# Patient Record
Sex: FEMALE | Race: WHITE | NOT HISPANIC OR LATINO
[De-identification: names, ages, dates, MRNs, and addresses within clinical notes are randomized per-mention and may not be internally consistent; named-entity substitution may affect disease eponyms.]

---

## 2019-08-22 PROBLEM — Z00.00 ENCOUNTER FOR PREVENTIVE HEALTH EXAMINATION: Status: ACTIVE | Noted: 2019-08-22

## 2019-08-23 ENCOUNTER — APPOINTMENT (OUTPATIENT)
Dept: BREAST CENTER | Facility: CLINIC | Age: 24
End: 2019-08-23
Payer: COMMERCIAL

## 2019-08-23 VITALS
DIASTOLIC BLOOD PRESSURE: 75 MMHG | HEART RATE: 104 BPM | BODY MASS INDEX: 19.26 KG/M2 | HEIGHT: 69 IN | SYSTOLIC BLOOD PRESSURE: 118 MMHG | WEIGHT: 130 LBS

## 2019-08-23 DIAGNOSIS — Z72.3 LACK OF PHYSICAL EXERCISE: ICD-10-CM

## 2019-08-23 DIAGNOSIS — Z78.9 OTHER SPECIFIED HEALTH STATUS: ICD-10-CM

## 2019-08-23 PROCEDURE — 99203 OFFICE O/P NEW LOW 30 MIN: CPT

## 2019-08-25 ENCOUNTER — TRANSCRIPTION ENCOUNTER (OUTPATIENT)
Age: 24
End: 2019-08-25

## 2019-09-16 ENCOUNTER — MESSAGE (OUTPATIENT)
Age: 24
End: 2019-09-16

## 2019-09-16 NOTE — HISTORY OF PRESENT ILLNESS
[FreeTextEntry1] : 24 year old Rastafarian female referred by Dr. Walters presents for consultation regarding left palpable breast mass 12:00 first noted 3 years ago. Pt states it has not grown.  Denies other masses. \par \par 8/21/19 ([facility])--b/l breast US showing multiple smoothly outlined hypoechoic solid nodules in the breasts, bilaterally consistent with benign fibroadenomas. Dominant palpable heterogeneous hypoechoic solid nodule left breast 12:00 4cmFN with increased vascularity 3 x 2.1 x 3.1cm. Us guided core biopsy recc to r/o phyllodes tumor BR 4. Remaining solid nodules b/l can be followed in short interval f/u. \par 8/23/19 ([facility])--patient has US core biopsy of left 12:00 mass scheduled\par \par Denies family h/o breast or ovarian cancer. No h/o other cancers. \par \par

## 2019-09-16 NOTE — PAST MEDICAL HISTORY
[Menstruating] : The patient is menstruating [Menarche Age ____] : age at menarche was [unfilled] [Definite ___ (Date)] : the last menstrual period was [unfilled] [Irregular Cycle Intervals] : are  irregular [Total Preg ___] : G[unfilled] [History of Hormone Replacement Treatment] : has no history of hormone replacement treatment [FreeTextEntry5] : None [FreeTextEntry7] : OCP's x 3 years, stopped in May 2019 [FreeTextEntry6] : None [FreeTextEntry8] : No history

## 2019-09-16 NOTE — ADDENDUM
[FreeTextEntry1] : Will await concordance and if rec is for excisional biopsy of the 12:00, will schedule and 2:00 was FA- if concorrdant will offer 6 mos f.u u/s vs core bx.

## 2019-09-16 NOTE — DATA REVIEWED
[FreeTextEntry1] : 8/21/19 ([facility])--b/l breast US showing multiple smoothly outlined hypoechoic solid nodules in the breasts, bilaterally consistent with benign fibroadenomas. Dominant palpable heterogeneous hypoechoic solid nodule left breast 12:00 4cmFN with increased vascularity 3 x 2.1 x 3.1cm. Us guided core biopsy recc to r/o phyllodes tumor BR 4. Remaining solid nodules b/l can be followed in short interval f/u. \par 8/23/19 ([facility])--patient has US core biopsy of left 12:00 mass scheduled\par

## 2019-09-16 NOTE — PHYSICAL EXAM
[Normocephalic] : normocephalic [Atraumatic] : atraumatic [Supple] : supple [No Supraclavicular Adenopathy] : no supraclavicular adenopathy [No Thyromegaly] : no thyromegaly [Examined in the supine and seated position] : examined in the supine and seated position [Bra Size: ___] : Bra Size: [unfilled] [No dominant masses] : no dominant masses in right breast  [No dominant masses] : no dominant masses left breast [No Nipple Retraction] : no left nipple retraction [No Nipple Discharge] : no left nipple discharge [No Axillary Lymphadenopathy] : no left axillary lymphadenopathy [No Edema] : no edema [No Swelling] : no swelling [Full ROM] : full range of motion [No Rashes] : no rashes [No Ulceration] : no ulceration [de-identified] : 9:00 3N 1.5 cm subtly palpable.  [de-identified] : 12:00 4cmFN palpable mass ~3cm in size, subtly palpable mass at 2:00 2 cm 5N.

## 2019-09-16 NOTE — ASSESSMENT
[FreeTextEntry1] : 24 year old Taoism female referred by Dr. Walters presents for consultation regarding left palpable breast mass 12:00 first noted 3 years ago. Pt states it has not grown.  Denies other masses. \par \par 8/21/19 ([facility])--b/l breast US showing multiple smoothly outlined hypoechoic solid nodules in the breasts, bilaterally consistent with benign fibroadenomas. Dominant palpable heterogeneous hypoechoic solid nodule left breast 12:00 4cmFN with increased vascularity 3 x 2.1 x 3.1cm. Us guided core biopsy recc to r/o phyllodes tumor BR 4. Remaining solid nodules b/l can be followed in short interval f/u. \par 8/23/19 ([facility])--patient has US core biopsy of left 12:00 mass scheduled\par \par Denies family h/o breast or ovarian cancer. No h/o other cancers. \par \par CBE: Right breast 9:00 3N 1.5 cm and Left 2:00 5N 2cm, subtly palpable. Left 12:00 4 N 3 cm mass is palpable. All the other masses are not palpable. \par Reviewed findings and u/s with pt. Would also rec bx on the left 2:00, 2 cm in addition to the 12:00 3 cm lesion. Would consider excisional bx of the 12:00 lesion regardless of pathology given size. The 2:00 pending path.  Rec 6 mos f.u of the other nodules pending pathology of the 2 largest biopsied nodules.

## 2019-09-17 ENCOUNTER — MESSAGE (OUTPATIENT)
Age: 24
End: 2019-09-17

## 2019-09-17 ENCOUNTER — CHART COPY (OUTPATIENT)
Age: 24
End: 2019-09-17

## 2019-09-23 ENCOUNTER — OUTPATIENT (OUTPATIENT)
Dept: OUTPATIENT SERVICES | Facility: HOSPITAL | Age: 24
LOS: 1 days | End: 2019-09-23
Payer: COMMERCIAL

## 2019-09-23 ENCOUNTER — RESULT REVIEW (OUTPATIENT)
Age: 24
End: 2019-09-23

## 2019-09-23 DIAGNOSIS — D24.2 BENIGN NEOPLASM OF LEFT BREAST: ICD-10-CM

## 2019-09-23 LAB — SURGICAL PATHOLOGY STUDY: SIGNIFICANT CHANGE UP

## 2019-09-25 ENCOUNTER — OTHER (OUTPATIENT)
Age: 24
End: 2019-09-25

## 2019-09-26 ENCOUNTER — RESULT REVIEW (OUTPATIENT)
Age: 24
End: 2019-09-26

## 2019-09-26 ENCOUNTER — OUTPATIENT (OUTPATIENT)
Dept: INPATIENT UNIT | Facility: HOSPITAL | Age: 24
LOS: 1 days | End: 2019-09-26
Payer: COMMERCIAL

## 2019-09-26 ENCOUNTER — APPOINTMENT (OUTPATIENT)
Dept: BREAST CENTER | Facility: HOSPITAL | Age: 24
End: 2019-09-26
Payer: COMMERCIAL

## 2019-09-26 ENCOUNTER — APPOINTMENT (OUTPATIENT)
Dept: MAMMOGRAPHY | Facility: HOSPITAL | Age: 24
End: 2019-09-26

## 2019-09-26 VITALS
HEART RATE: 99 BPM | OXYGEN SATURATION: 100 % | DIASTOLIC BLOOD PRESSURE: 66 MMHG | RESPIRATION RATE: 16 BRPM | SYSTOLIC BLOOD PRESSURE: 121 MMHG | TEMPERATURE: 98 F

## 2019-09-26 VITALS
TEMPERATURE: 99 F | HEIGHT: 69 IN | WEIGHT: 126.1 LBS | RESPIRATION RATE: 16 BRPM | SYSTOLIC BLOOD PRESSURE: 113 MMHG | HEART RATE: 97 BPM | OXYGEN SATURATION: 100 % | DIASTOLIC BLOOD PRESSURE: 79 MMHG

## 2019-09-26 DIAGNOSIS — Z41.9 ENCOUNTER FOR PROCEDURE FOR PURPOSES OTHER THAN REMEDYING HEALTH STATE, UNSPECIFIED: Chronic | ICD-10-CM

## 2019-09-26 PROCEDURE — 19120 REMOVAL OF BREAST LESION: CPT | Mod: 59

## 2019-09-26 PROCEDURE — 77065 DX MAMMO INCL CAD UNI: CPT

## 2019-09-26 PROCEDURE — 19125 EXCISION BREAST LESION: CPT

## 2019-09-26 PROCEDURE — C1889: CPT

## 2019-09-26 PROCEDURE — 19285 PERQ DEV BREAST 1ST US IMAG: CPT | Mod: LT

## 2019-09-26 PROCEDURE — 77065 DX MAMMO INCL CAD UNI: CPT | Mod: 26,LT

## 2019-09-26 PROCEDURE — C1819: CPT

## 2019-09-26 PROCEDURE — 76098 X-RAY EXAM SURGICAL SPECIMEN: CPT

## 2019-09-26 PROCEDURE — 88307 TISSUE EXAM BY PATHOLOGIST: CPT

## 2019-09-26 PROCEDURE — 19126 EXCISION ADDL BREAST LESION: CPT

## 2019-09-26 PROCEDURE — 76098 X-RAY EXAM SURGICAL SPECIMEN: CPT | Mod: 26

## 2019-09-26 PROCEDURE — 88305 TISSUE EXAM BY PATHOLOGIST: CPT

## 2019-09-26 PROCEDURE — 19285 PERQ DEV BREAST 1ST US IMAG: CPT

## 2019-09-26 PROCEDURE — 88321 CONSLTJ&REPRT SLD PREP ELSWR: CPT

## 2019-09-26 RX ORDER — ACETAMINOPHEN WITH CODEINE 300MG-30MG
1 TABLET ORAL
Qty: 10 | Refills: 0
Start: 2019-09-26

## 2019-09-26 RX ORDER — ACETAMINOPHEN WITH CODEINE 300MG-30MG
1 TABLET ORAL
Qty: 5 | Refills: 0
Start: 2019-09-26

## 2019-09-26 NOTE — BRIEF OPERATIVE NOTE - OPERATION/FINDINGS
Superior circumareolar incision. 3 masses removed including the one with needle loc, confirmed with specimen radiography. Haemostasis achieved. Skin closed with vicryl and 5-0 monocryl. Superior circumareolar incision. 4 masses removed including the one with needle loc, confirmed with specimen radiography. Haemostasis achieved. Skin closed with vicryl and 5-0 monocryl.

## 2019-09-27 ENCOUNTER — APPOINTMENT (OUTPATIENT)
Dept: BREAST CENTER | Facility: CLINIC | Age: 24
End: 2019-09-27
Payer: COMMERCIAL

## 2019-09-27 ENCOUNTER — APPOINTMENT (OUTPATIENT)
Dept: BREAST CENTER | Facility: CLINIC | Age: 24
End: 2019-09-27

## 2019-09-27 VITALS
HEART RATE: 91 BPM | SYSTOLIC BLOOD PRESSURE: 114 MMHG | BODY MASS INDEX: 18.81 KG/M2 | WEIGHT: 127 LBS | DIASTOLIC BLOOD PRESSURE: 74 MMHG | HEIGHT: 69 IN

## 2019-09-27 PROBLEM — F41.9 ANXIETY DISORDER, UNSPECIFIED: Chronic | Status: ACTIVE | Noted: 2019-09-25

## 2019-09-27 PROBLEM — D24.9 BENIGN NEOPLASM OF UNSPECIFIED BREAST: Chronic | Status: ACTIVE | Noted: 2019-09-25

## 2019-09-27 PROCEDURE — 99024 POSTOP FOLLOW-UP VISIT: CPT

## 2019-09-27 NOTE — HISTORY OF PRESENT ILLNESS
[FreeTextEntry1] : 24 year old Restoration female presents for post op she is 1 day s/p left breast excisional biopsy x 2 on 9/27/19 for left breast mass 12:00 and 2:00 final surgical pathology pending. Additional masses at 11:00 x3 also removed at time of surgery, consistent with FA. \par Pt doing well postop, here with her mother Rosalia. Did not require any pain Rx. Going back to college on sunday. \par \par 8/21/19 ([facility])--b/l breast US showing multiple smoothly outlined hypoechoic solid nodules in the breasts, bilaterally consistent with benign fibroadenomas. Dominant palpable heterogeneous hypoechoic solid nodule left breast 12:00 4cmFN with increased vascularity 3 x 2.1 x 3.1cm. Us guided core biopsy recc to r/o phyllodes tumor BR 4. Remaining solid nodules b/l can be followed in short interval f/u. \par 8/23/19 ([facility])--patient has US core biopsy of left 12:00 pathology benign fibroepithelial lesion, FA vs benign phyllodes, left breast 2:00 5cmFN pathology fibroadenoma \par 9/23/19 ([facility]) right breast 9:00 US core biopsy pathology fibroadenoma \par \par \par Denies family h/o breast or ovarian cancer. No h/o other cancers. \par \par

## 2019-09-27 NOTE — DATA REVIEWED
[FreeTextEntry1] : 8/21/19 ([facility])--b/l breast US showing multiple smoothly outlined hypoechoic solid nodules in the breasts, bilaterally consistent with benign fibroadenomas. Dominant palpable heterogeneous hypoechoic solid nodule left breast 12:00 4cmFN with increased vascularity 3 x 2.1 x 3.1cm. Us guided core biopsy recc to r/o phyllodes tumor BR 4. Remaining solid nodules b/l can be followed in short interval f/u. \par 8/23/19 ([facility])--patient has US core biopsy of left 12:00 pathology benign fibroepithelial lesion, FA vs benign phyllodes, left breast 2:00 5cmFN pathology fibroadenoma \par 9/23/19 ([facility]) right breast 9:00 US core biopsy pathology fibroadenoma

## 2019-09-27 NOTE — ASSESSMENT
[FreeTextEntry1] : 24 year old Oriental orthodox female presents for post op she is 1 day s/p left breast excisional biopsy x 2 for left breast mass 12:00 and 2:00 final surgical pathology pending. \par \par 8/21/19 ([facility])--b/l breast US showing multiple smoothly outlined hypoechoic solid nodules in the breasts, bilaterally consistent with benign fibroadenomas. Dominant palpable heterogeneous hypoechoic solid nodule left breast 12:00 4cmFN with increased vascularity 3 x 2.1 x 3.1cm. Us guided core biopsy recc to r/o phyllodes tumor BR 4. Remaining solid nodules b/l can be followed in short interval f/u. \par 8/23/19 ([facility])--patient has US core biopsy of left 12:00 pathology benign fibroepithelial lesion, FA vs benign phyllodes, left breast 2:00 5cmFN pathology fibroadenoma \par 9/23/19 ([facility]) right breast 9:00 US core biopsy pathology fibroadenoma \par \par CBE: PA 12-3:00 inc intact.  Some ecchymosis\par Ace reapplied, wear sports bra, activities and restrictions discussed. Continue with ice packs. \par Denies family h/o breast or ovarian cancer. No h/o other cancers. \par \par

## 2019-10-02 LAB — SURGICAL PATHOLOGY STUDY: SIGNIFICANT CHANGE UP

## 2019-10-11 ENCOUNTER — APPOINTMENT (OUTPATIENT)
Dept: BREAST CENTER | Facility: CLINIC | Age: 24
End: 2019-10-11
Payer: COMMERCIAL

## 2019-10-11 PROCEDURE — 99024 POSTOP FOLLOW-UP VISIT: CPT

## 2020-02-14 NOTE — ASSESSMENT
[FreeTextEntry1] : 24 year old Evangelical female presents for post op she is 2 weeks s/p left breast excisional biopsy x 2 on 9/27/19 for left breast mass 12:00 and 2:00. Additional mass at 11:00 x3 also removed at time of surgery, consistent with FA. Final surgical pathology Left 12:00 fibroadenoma, left breast 11:00 fibroadenoma, left breast 2:00 fibroadenoma. Pt doing well postop, no c/o. \par \par 8/21/19 ([facility])--b/l breast US showing multiple smoothly outlined hypoechoic solid nodules in the breasts, bilaterally consistent with benign fibroadenomas. Dominant palpable heterogeneous hypoechoic solid nodule left breast 12:00 4cmFN with increased vascularity 3 x 2.1 x 3.1cm. Us guided core biopsy recc to r/o phyllodes tumor BR 4. Remaining solid nodules b/l can be followed in short interval f/u. \par 8/23/19 ([facility])--patient has US core biopsy of left 12:00 pathology benign fibroepithelial lesion, FA vs benign phyllodes, left breast 2:00 5cmFN pathology fibroadenoma \par 9/23/19 ([facility]) right breast 9:00 US core biopsy pathology fibroadenoma \par 9/26/19 ([facility]) Final surgical pathology Left 12:00 fibroadenoma, left breast 11:00 fibroadenoma, left breast 2:00 fibroadenoma. \par \par Denies family h/o breast or ovarian cancer. No h/o other cancers. \par CBE: Left PA incision intact and healing well.

## 2020-02-14 NOTE — DATA REVIEWED
[FreeTextEntry1] : 8/21/19 ([facility])--b/l breast US showing multiple smoothly outlined hypoechoic solid nodules in the breasts, bilaterally consistent with benign fibroadenomas. Dominant palpable heterogeneous hypoechoic solid nodule left breast 12:00 4cmFN with increased vascularity 3 x 2.1 x 3.1cm. Us guided core biopsy recc to r/o phyllodes tumor BR 4. Remaining solid nodules b/l can be followed in short interval f/u. \par 8/23/19 ([facility])--patient has US core biopsy of left 12:00 pathology benign fibroepithelial lesion, FA vs benign phyllodes, left breast 2:00 5cmFN pathology fibroadenoma \par 9/23/19 ([facility]) right breast 9:00 US core biopsy pathology fibroadenoma

## 2020-02-14 NOTE — HISTORY OF PRESENT ILLNESS
[FreeTextEntry1] : 24 year old Christianity female presents for post op she is 2 weeks s/p left breast excisional biopsy x 2 on 9/27/19 for left breast mass 12:00 and 2:00. Additional mass at 11:00 x3 also removed at time of surgery, consistent with FA. Final surgical pathology Left 12:00 fibroadenoma, left breast 11:00 fibroadenoma, left breast 2:00 fibroadenoma. Pt doing well postop, no c/o. \par \par 8/21/19 ([facility])--b/l breast US showing multiple smoothly outlined hypoechoic solid nodules in the breasts, bilaterally consistent with benign fibroadenomas. Dominant palpable heterogeneous hypoechoic solid nodule left breast 12:00 4cmFN with increased vascularity 3 x 2.1 x 3.1cm. Us guided core biopsy recc to r/o phyllodes tumor BR 4. Remaining solid nodules b/l can be followed in short interval f/u. \par 8/23/19 ([facility])--patient has US core biopsy of left 12:00 pathology benign fibroepithelial lesion, FA vs benign phyllodes, left breast 2:00 5cmFN pathology fibroadenoma \par 9/23/19 ([facility]) right breast 9:00 US core biopsy pathology fibroadenoma \par 9/26/19 ([facility]) Final surgical pathology Left 12:00 fibroadenoma, left breast 11:00 fibroadenoma, left breast 2:00 fibroadenoma. \par \par Denies family h/o breast or ovarian cancer. No h/o other cancers. \par \par

## 2020-02-14 NOTE — ADDENDUM
[FreeTextEntry1] : 2/5/20 [facility] Erik US: compared to 9/18/19, no lymphadenopathy erik,\par R 9:00 N3 (1.5x0.7x1.7cm) benign biopsied mass,\par    10:00 N7 (0.7x0.4x0.8cm) circumscribed mass,\par L  9:00 N2-5 (1.0x0.5x0.7cm) circumscribed mass,\par     9:00 N2-5 (0.7x0.4x0.9cm) circumscribed mass. BR0. Requesting comparison with outside images.\par    \par

## 2020-07-08 ENCOUNTER — APPOINTMENT (OUTPATIENT)
Dept: BREAST CENTER | Facility: CLINIC | Age: 25
End: 2020-07-08
Payer: COMMERCIAL

## 2020-07-08 PROCEDURE — 99203 OFFICE O/P NEW LOW 30 MIN: CPT | Mod: 95

## 2020-07-29 ENCOUNTER — APPOINTMENT (OUTPATIENT)
Dept: ULTRASOUND IMAGING | Facility: HOSPITAL | Age: 25
End: 2020-07-29
Payer: COMMERCIAL

## 2020-07-29 ENCOUNTER — APPOINTMENT (OUTPATIENT)
Dept: BREAST CENTER | Facility: CLINIC | Age: 25
End: 2020-07-29
Payer: COMMERCIAL

## 2020-07-29 ENCOUNTER — RESULT REVIEW (OUTPATIENT)
Age: 25
End: 2020-07-29

## 2020-07-29 ENCOUNTER — OUTPATIENT (OUTPATIENT)
Dept: OUTPATIENT SERVICES | Facility: HOSPITAL | Age: 25
LOS: 1 days | End: 2020-07-29
Payer: COMMERCIAL

## 2020-07-29 VITALS — WEIGHT: 130 LBS | OXYGEN SATURATION: 98 % | HEIGHT: 69 IN | BODY MASS INDEX: 19.26 KG/M2 | HEART RATE: 105 BPM

## 2020-07-29 DIAGNOSIS — Z41.9 ENCOUNTER FOR PROCEDURE FOR PURPOSES OTHER THAN REMEDYING HEALTH STATE, UNSPECIFIED: Chronic | ICD-10-CM

## 2020-07-29 PROCEDURE — 76641 ULTRASOUND BREAST COMPLETE: CPT | Mod: 26,50

## 2020-07-29 PROCEDURE — 76641 ULTRASOUND BREAST COMPLETE: CPT

## 2020-07-29 PROCEDURE — 99213 OFFICE O/P EST LOW 20 MIN: CPT

## 2020-07-29 NOTE — HISTORY OF PRESENT ILLNESS
[FreeTextEntry1] : 24 year old Anabaptist female, previously under the care of Dr. Mary Westbrook, presents for evaluation as she has multiple b/l breast nodules and has a h/o left breast excisional biopsy x 2 on 9/27/19 for left breast mass 12:00 and 2:00. Additional mass at 11:00 was also removed at time of surgery, all of which final surgical pathology was consistent with fibroadenoma. DARI without mammographic density 14.5%. She denies any palpable abnormalities, no skin changes, no nipple discharge bilaterally. \par \par Discussed the importance of a physical exam in person.\par \par Discussed that recommendations for breast cancer surveillance in an average risk woman should be annual mammograms, with supplemental ultrasound for dense breasts and start at the age of 40 years old. Patient expressed understanding\par \par

## 2020-07-29 NOTE — REASON FOR VISIT
[Home] : at home, [unfilled] , at the time of the visit. [Consultation] : a consultation visit [Verbal consent obtained from patient] : the patient, [unfilled] [Other Location: e.g. Home (Enter Location, City,State)___] : at [unfilled] [Mother] : mother [Father] : father [FreeTextEntry1] :  multiple b/l breast nodules and has a h/o left breast excisional biopsy x 2 on 9/27/19 for left breast mass 12:00 and 2:00.

## 2020-07-29 NOTE — DATA REVIEWED
[FreeTextEntry1] : 8/21/19 ([facility])--b/l breast US showing multiple smoothly outlined hypoechoic solid nodules in the breasts, bilaterally consistent with benign fibroadenomas. Dominant palpable heterogeneous hypoechoic solid nodule left breast 12:00 4cmFN with increased vascularity 3 x 2.1 x 3.1cm. Us guided core biopsy recc to r/o phyllodes tumor BR 4. Remaining solid nodules b/l can be followed in short interval f/u. \par 8/23/19 ([facility])--patient has US core biopsy of left 12:00 pathology benign fibroepithelial lesion, FA vs benign phyllodes, left breast 2:00 5cmFN pathology fibroadenoma \par --9/23/19 ([facility]) right breast 9:00 US core biopsy pathology fibroadenoma \par 9/26/19 ([facility]) Final surgical pathology Left 12:00 fibroadenoma, left breast 11:00 fibroadenoma, left breast 2:00 fibroadenoma. \par \par 2/5/2020 ([facility]) b/l breast US showing right 9:00 3cmFN a 1.5 x 0.7 x 1.7 circumscribed hypoechoic mass, previously biopsied benign pathology. Right 10:00 7cmFN a 0.7 x 0.4 x 0.8cm circumscribed hypoechoic mass. Left breast post-surgical scarring at 12:00. Left 9:00 2-5cmFN a 1.0 x 0.5 x 0.7cm circumscribed hypoechoic mass seen, left breast 9:00 2-5cmFN a 0.7 x 0.4 x 0.9cm circumscribed hypoechoic mass seen. Benign b/l breast US BIRADS 2 \par

## 2020-07-29 NOTE — PAST MEDICAL HISTORY
[Menarche Age ____] : age at menarche was [unfilled] [Menstruating] : The patient is menstruating [Definite ___ (Date)] : the last menstrual period was [unfilled] [Total Preg ___] : G[unfilled] [FreeTextEntry5] : NA [FreeTextEntry6] : NA [FreeTextEntry7] : NA [FreeTextEntry8] : NA

## 2020-08-03 NOTE — DATA REVIEWED
[FreeTextEntry1] : 8/21/19 ([facility])--b/l breast US showing multiple smoothly outlined hypoechoic solid nodules in the breasts, bilaterally consistent with benign fibroadenomas. Dominant palpable heterogeneous hypoechoic solid nodule left breast 12:00 4cmFN with increased vascularity 3 x 2.1 x 3.1cm. Us guided core biopsy recc to r/o phyllodes tumor BR 4. Remaining solid nodules b/l can be followed in short interval f/u. \par 8/23/19 ([facility])--patient has US core biopsy of left 12:00 pathology benign fibroepithelial lesion, FA vs benign phyllodes, left breast 2:00 5cmFN pathology fibroadenoma \par --9/23/19 ([facility]) right breast 9:00 US core biopsy pathology fibroadenoma \par 9/26/19 ([facility]) Final surgical pathology Left 12:00 fibroadenoma, left breast 11:00 fibroadenoma, left breast 2:00 fibroadenoma. \par \par 2/5/2020 ([facility]) b/l breast US showing right 9:00 3cmFN a 1.5 x 0.7 x 1.7 circumscribed hypoechoic mass, previously biopsied benign pathology. Right 10:00 7cmFN a 0.7 x 0.4 x 0.8cm circumscribed hypoechoic mass. Left breast post-surgical scarring at 12:00. Left 9:00 2-5cmFN a 1.0 x 0.5 x 0.7cm circumscribed hypoechoic mass seen, left breast 9:00 2-5cmFN a 0.7 x 0.4 x 0.9cm circumscribed hypoechoic mass seen. Benign b/l breast US BIRADS 2 \par

## 2020-08-03 NOTE — PAST MEDICAL HISTORY
[Menarche Age ____] : age at menarche was [unfilled] [Menstruating] : The patient is menstruating [Total Preg ___] : G[unfilled] [Definite ___ (Date)] : the last menstrual period was [unfilled] [FreeTextEntry5] : NA [FreeTextEntry6] : NA [FreeTextEntry7] : NA [FreeTextEntry8] : NA

## 2020-08-03 NOTE — PHYSICAL EXAM
[Normocephalic] : normocephalic [Atraumatic] : atraumatic [Supple] : supple [No Supraclavicular Adenopathy] : no supraclavicular adenopathy [Examined in the supine and seated position] : examined in the supine and seated position [No dominant masses] : no dominant masses in right breast  [No dominant masses] : no dominant masses left breast [No Nipple Retraction] : no left nipple retraction [No Nipple Discharge] : no right nipple discharge [No Axillary Lymphadenopathy] : no left axillary lymphadenopathy [No Edema] : no edema [No Rashes] : no rashes [No Ulceration] : no ulceration

## 2020-09-08 ENCOUNTER — APPOINTMENT (OUTPATIENT)
Dept: OBGYN | Facility: CLINIC | Age: 25
End: 2020-09-08
Payer: COMMERCIAL

## 2020-09-08 VITALS
SYSTOLIC BLOOD PRESSURE: 100 MMHG | HEIGHT: 69 IN | WEIGHT: 139 LBS | DIASTOLIC BLOOD PRESSURE: 60 MMHG | BODY MASS INDEX: 20.59 KG/M2

## 2020-09-08 DIAGNOSIS — Z30.011 ENCOUNTER FOR INITIAL PRESCRIPTION OF CONTRACEPTIVE PILLS: ICD-10-CM

## 2020-09-08 PROCEDURE — 99202 OFFICE O/P NEW SF 15 MIN: CPT

## 2020-09-08 NOTE — CHIEF COMPLAINT
[Follow Up] : follow up GYN visit [FreeTextEntry1] : PATIENT PRESENT FOR GYN FOLLOW UP .PATIENT STATES SHE WILL LIKE TO DISCUSS OPTION FOR BIRTH CONTROL. PATIENT HAD A PAP 6 MONTHS AGO AND PAP TEST WAS NORMAL.

## 2021-02-08 ENCOUNTER — APPOINTMENT (OUTPATIENT)
Dept: ULTRASOUND IMAGING | Facility: HOSPITAL | Age: 26
End: 2021-02-08
Payer: COMMERCIAL

## 2021-02-08 ENCOUNTER — RESULT REVIEW (OUTPATIENT)
Age: 26
End: 2021-02-08

## 2021-02-08 ENCOUNTER — OUTPATIENT (OUTPATIENT)
Dept: OUTPATIENT SERVICES | Facility: HOSPITAL | Age: 26
LOS: 1 days | End: 2021-02-08
Payer: COMMERCIAL

## 2021-02-08 DIAGNOSIS — Z41.9 ENCOUNTER FOR PROCEDURE FOR PURPOSES OTHER THAN REMEDYING HEALTH STATE, UNSPECIFIED: Chronic | ICD-10-CM

## 2021-02-08 PROCEDURE — 76641 ULTRASOUND BREAST COMPLETE: CPT | Mod: 26,50

## 2021-02-08 PROCEDURE — 76641 ULTRASOUND BREAST COMPLETE: CPT

## 2021-02-16 ENCOUNTER — NON-APPOINTMENT (OUTPATIENT)
Age: 26
End: 2021-02-16

## 2021-02-17 RX ORDER — NORETHINDRONE ACETATE AND ETHINYL ESTRADIOL AND FERROUS FUMARATE 1MG-20(21)
1-20 KIT ORAL
Qty: 3 | Refills: 3 | Status: ACTIVE | COMMUNITY
Start: 2021-02-17 | End: 1900-01-01

## 2021-03-04 ENCOUNTER — NON-APPOINTMENT (OUTPATIENT)
Age: 26
End: 2021-03-04

## 2021-03-09 ENCOUNTER — APPOINTMENT (OUTPATIENT)
Dept: BREAST CENTER | Facility: CLINIC | Age: 26
End: 2021-03-09
Payer: COMMERCIAL

## 2021-03-09 VITALS
DIASTOLIC BLOOD PRESSURE: 82 MMHG | SYSTOLIC BLOOD PRESSURE: 123 MMHG | HEART RATE: 90 BPM | BODY MASS INDEX: 19.26 KG/M2 | HEIGHT: 69 IN | WEIGHT: 130 LBS

## 2021-03-09 PROCEDURE — 99213 OFFICE O/P EST LOW 20 MIN: CPT

## 2021-03-09 PROCEDURE — 99072 ADDL SUPL MATRL&STAF TM PHE: CPT

## 2021-03-09 RX ORDER — NORETHINDRONE ACETATE AND ETHINYL ESTRADIOL, ETHINYL ESTRADIOL AND FERROUS FUMARATE 1MG-10(24)
1 MG-10 MCG / KIT ORAL DAILY
Qty: 3 | Refills: 6 | Status: DISCONTINUED | COMMUNITY
Start: 2020-09-08 | End: 2021-03-09

## 2021-03-16 NOTE — PAST MEDICAL HISTORY
[Menstruating] : The patient is menstruating [Menarche Age ____] : age at menarche was [unfilled] [Definite ___ (Date)] : the last menstrual period was [unfilled] [Total Preg ___] : G[unfilled] [FreeTextEntry5] : NA [FreeTextEntry6] : NA [FreeTextEntry7] : NA [FreeTextEntry8] : NA

## 2021-03-16 NOTE — HISTORY OF PRESENT ILLNESS
[FreeTextEntry1] : 25 year old Mosque female, previously under the care of Dr. Mary Westbrook, presents for evaluation as she has multiple b/l breast nodules and has a h/o left breast excisional biopsy x 2 on 9/27/19 for left breast mass 12:00 and 2:00. Additional mass at 11:00 was also removed at time of surgery, all of which final surgical pathology was consistent with fibroadenoma. DARI without mammographic density 14.5%. She denies any palpable abnormalities, no skin changes, no nipple discharge bilaterally.\par \par Discussed that recommendations for breast cancer surveillance in an average risk woman should be annual mammograms, with supplemental ultrasound for dense breasts and start at the age of 40 years old. Patient expressed understanding\par \par

## 2021-03-16 NOTE — DATA REVIEWED
[FreeTextEntry1] : 8/21/19 ([facility])--b/l breast US showing multiple smoothly outlined hypoechoic solid nodules in the breasts, bilaterally consistent with benign fibroadenomas. Dominant palpable heterogeneous hypoechoic solid nodule left breast 12:00 4cmFN with increased vascularity 3 x 2.1 x 3.1cm. Us guided core biopsy recc to r/o phyllodes tumor BR 4. Remaining solid nodules b/l can be followed in short interval f/u. \par 8/23/19 ([facility])--patient has US core biopsy of left 12:00 pathology benign fibroepithelial lesion, FA vs benign phyllodes, left breast 2:00 5cmFN pathology fibroadenoma \par --9/23/19 ([facility]) right breast 9:00 US core biopsy pathology fibroadenoma \par 9/26/19 ([facility]) Final surgical pathology Left 12:00 fibroadenoma, left breast 11:00 fibroadenoma, left breast 2:00 fibroadenoma. \par \par 2/5/2020 ([facility]) b/l breast US showing right 9:00 3cmFN a 1.5 x 0.7 x 1.7 circumscribed hypoechoic mass, previously biopsied benign pathology. Right 10:00 7cmFN a 0.7 x 0.4 x 0.8cm circumscribed hypoechoic mass. Left breast post-surgical scarring at 12:00. Left 9:00 2-5cmFN a 1.0 x 0.5 x 0.7cm circumscribed hypoechoic mass seen, left breast 9:00 2-5cmFN a 0.7 x 0.4 x 0.9cm circumscribed hypoechoic mass seen. Benign b/l breast US BIRADS 2 \par \par [facility] (7/14/2020) B/l US- R 1.6cm 9:00, 3FN hypoechoic lesion, bx proven fibroadenoma and stable. R 0.7cm, 10:00, 7FN hypoechoic lesion, stable. L 0.9cm, 9:00, 2.5FN and adjacent 0.8cm hypoechoic lesions, stable. BIRADS 3.\par \par \par [facility] (2/[facility]/20[facility]) B/L US: New R 0.6cm, 3:30-4:00, periareolar parallel smooth bordered hypoechoic lesion. R 2cm 9:00, 3FN hypoechoic lesion w/ lobulated borders, bx proven fibroadenoma. R 0.7cm, 10:00, 7FN, oval shaped parallel hypoechoic lesion, stable. L 1 cm and 1.1cm, 9:00, 2.5FN two adjacent oval-shaped hypoechoic lesions, stable. BIRADS 3. \par

## 2021-03-16 NOTE — REASON FOR VISIT
[Home] : at home, [unfilled] , at the time of the visit. [Other Location: e.g. Home (Enter Location, City,State)___] : at [unfilled] [Mother] : mother [Father] : father [Verbal consent obtained from patient] : the patient, [unfilled] [Consultation] : a consultation visit [Follow-Up: _____] : a [unfilled] follow-up visit [FreeTextEntry1] : hx of multiple b/l nodules

## 2021-04-07 ENCOUNTER — APPOINTMENT (OUTPATIENT)
Dept: DISASTER EMERGENCY | Facility: OTHER | Age: 26
End: 2021-04-07
Payer: COMMERCIAL

## 2021-04-07 PROCEDURE — 0001A: CPT

## 2021-05-27 ENCOUNTER — RESULT REVIEW (OUTPATIENT)
Age: 26
End: 2021-05-27

## 2021-10-18 ENCOUNTER — APPOINTMENT (OUTPATIENT)
Dept: BREAST CENTER | Facility: CLINIC | Age: 26
End: 2021-10-18

## 2021-10-29 ENCOUNTER — NON-APPOINTMENT (OUTPATIENT)
Age: 26
End: 2021-10-29

## 2021-10-29 ENCOUNTER — TRANSCRIPTION ENCOUNTER (OUTPATIENT)
Age: 26
End: 2021-10-29

## 2021-11-12 ENCOUNTER — NON-APPOINTMENT (OUTPATIENT)
Age: 26
End: 2021-11-12

## 2021-11-15 ENCOUNTER — APPOINTMENT (OUTPATIENT)
Dept: BREAST CENTER | Facility: CLINIC | Age: 26
End: 2021-11-15
Payer: COMMERCIAL

## 2021-11-15 VITALS — BODY MASS INDEX: 19.26 KG/M2 | WEIGHT: 130 LBS | HEART RATE: 83 BPM | OXYGEN SATURATION: 100 % | HEIGHT: 69 IN

## 2021-11-15 PROCEDURE — 99213 OFFICE O/P EST LOW 20 MIN: CPT

## 2021-11-17 ENCOUNTER — NON-APPOINTMENT (OUTPATIENT)
Age: 26
End: 2021-11-17

## 2021-11-19 NOTE — HISTORY OF PRESENT ILLNESS
[FreeTextEntry1] : 25 year old Dru[place] female, previously under the care of Dr. Mary Westbrook, presents for evaluation as she has multiple b/l breast nodules and has a h/o left breast excisional biopsy x 2 on 9/27/19 for left breast mass 12:00 and 2:00. Additional mass at 11:00 was also removed at time of surgery, all of which final surgical pathology was consistent with fibroadenoma. DARI without mammographic density 14.5%. She denies any palpable abnormalities, no skin changes, no nipple discharge bilaterally.\par \par Discussed that recommendations for breast cancer surveillance in an average risk woman should be annual mammograms, with supplemental ultrasound for dense breasts and start at the age of 40 years old. Patient expressed understanding\par \par Latest u/s showed minor growth in the left breast 9n4 hypoechoic mass. Discussed that due to her history option to monitor vs biopsy. Patient understands the risk of a phyllodes tumor and if the lesion grows at the next u/s will excise.

## 2021-11-19 NOTE — DATA REVIEWED
[FreeTextEntry1] : 8/21/19 ([facility])--b/l breast US showing multiple smoothly outlined hypoechoic solid nodules in the breasts, bilaterally consistent with benign fibroadenomas. Dominant palpable heterogeneous hypoechoic solid nodule left breast 12:00 4cmFN with increased vascularity 3 x 2.1 x 3.1cm. Us guided core biopsy recc to r/o phyllodes tumor BR 4. Remaining solid nodules b/l can be followed in short interval f/u. \par 8/23/19 ([facility])--patient has US core biopsy of left 12:00 pathology benign fibroepithelial lesion, FA vs benign phyllodes, left breast 2:00 5cmFN pathology fibroadenoma \par --9/23/19 ([facility]) right breast 9:00 US core biopsy pathology fibroadenoma \par 9/26/19 ([facility]) Final surgical pathology Left 12:00 fibroadenoma, left breast 11:00 fibroadenoma, left breast 2:00 fibroadenoma. \par \par 2/5/2020 ([facility]) b/l breast US showing right 9:00 3cmFN a 1.5 x 0.7 x 1.7 circumscribed hypoechoic mass, previously biopsied benign pathology. Right 10:00 7cmFN a 0.7 x 0.4 x 0.8cm circumscribed hypoechoic mass. Left breast post-surgical scarring at 12:00. Left 9:00 2-5cmFN a 1.0 x 0.5 x 0.7cm circumscribed hypoechoic mass seen, left breast 9:00 2-5cmFN a 0.7 x 0.4 x 0.9cm circumscribed hypoechoic mass seen. Benign b/l breast US BIRADS 2 \par \par [facility] (7/14/2020) B/l US- R 1.6cm 9:00, 3FN hypoechoic lesion, bx proven fibroadenoma and stable. R 0.7cm, 10:00, 7FN hypoechoic lesion, stable. L 0.9cm, 9:00, 2.5FN and adjacent 0.8cm hypoechoic lesions, stable. BIRADS 3.\par \par \par [facility] (2/8/2021) B/L US: New R 0.6cm, 3:30-4:00, periareolar parallel smooth bordered hypoechoic lesion. R 2cm 9:00, 3FN hypoechoic lesion w/ lobulated borders, bx proven fibroadenoma. R 0.7cm, 10:00, 7FN, oval shaped parallel hypoechoic lesion, stable. L 1 cm and 1.1cm, 9:00, 2.5FN two adjacent oval-shaped hypoechoic lesions, stable. BIRADS 3. \par \par 10/27/21 ([facility]) b/l US: Interval increase in size of a hypoechoic mass in the left 9:00 axis, 4 cm from the nipple, measuring 1.7 cm, for which ultrasound-guided core biopsy is recommended at this time. \par Bilateral stable hypoechoic nodules, all likely representing benign fibroadenomas. BI-RADAS 4.

## 2022-02-28 ENCOUNTER — APPOINTMENT (OUTPATIENT)
Dept: MAMMOGRAPHY | Facility: HOSPITAL | Age: 27
End: 2022-02-28
Payer: COMMERCIAL

## 2022-02-28 ENCOUNTER — RESULT REVIEW (OUTPATIENT)
Age: 27
End: 2022-02-28

## 2022-02-28 ENCOUNTER — APPOINTMENT (OUTPATIENT)
Dept: ULTRASOUND IMAGING | Facility: HOSPITAL | Age: 27
End: 2022-02-28
Payer: COMMERCIAL

## 2022-02-28 ENCOUNTER — OUTPATIENT (OUTPATIENT)
Dept: OUTPATIENT SERVICES | Facility: HOSPITAL | Age: 27
LOS: 1 days | End: 2022-02-28
Payer: COMMERCIAL

## 2022-02-28 ENCOUNTER — APPOINTMENT (OUTPATIENT)
Dept: BREAST CENTER | Facility: CLINIC | Age: 27
End: 2022-02-28
Payer: COMMERCIAL

## 2022-02-28 VITALS — BODY MASS INDEX: 18.51 KG/M2 | HEART RATE: 95 BPM | OXYGEN SATURATION: 100 % | WEIGHT: 125 LBS | HEIGHT: 69 IN

## 2022-02-28 DIAGNOSIS — Z78.9 OTHER SPECIFIED HEALTH STATUS: ICD-10-CM

## 2022-02-28 DIAGNOSIS — Z41.9 ENCOUNTER FOR PROCEDURE FOR PURPOSES OTHER THAN REMEDYING HEALTH STATE, UNSPECIFIED: Chronic | ICD-10-CM

## 2022-02-28 PROCEDURE — 76641 ULTRASOUND BREAST COMPLETE: CPT

## 2022-02-28 PROCEDURE — 99213 OFFICE O/P EST LOW 20 MIN: CPT

## 2022-02-28 PROCEDURE — 76641 ULTRASOUND BREAST COMPLETE: CPT | Mod: 26,50

## 2022-03-02 NOTE — PAST MEDICAL HISTORY
[Menstruating] : The patient is menstruating [Menarche Age ____] : age at menarche was [unfilled] [Total Preg ___] : G[unfilled] [Definite ___ (Date)] : the last menstrual period was [unfilled] [FreeTextEntry5] : NA [FreeTextEntry6] : None [FreeTextEntry7] : yes [FreeTextEntry8] : None

## 2022-03-02 NOTE — PHYSICAL EXAM
[Normocephalic] : normocephalic [Atraumatic] : atraumatic [Supple] : supple [No Supraclavicular Adenopathy] : no supraclavicular adenopathy [Examined in the supine and seated position] : examined in the supine and seated position [No dominant masses] : no dominant masses in right breast  [No dominant masses] : no dominant masses left breast [No Nipple Retraction] : no left nipple retraction [No Nipple Discharge] : no left nipple discharge [No Axillary Lymphadenopathy] : no left axillary lymphadenopathy [No Edema] : no edema [No Rashes] : no rashes [No Ulceration] : no ulceration [de-identified] : no overt mass at left 9:00, soft and vague

## 2022-03-02 NOTE — HISTORY OF PRESENT ILLNESS
[FreeTextEntry1] : 26 year old Shinto female presents for evaluation as she has multiple b/l breast nodules and has a h/o left breast excisional biopsy x 2 on 9/27/19 for left breast mass 12:00 and 2:00. Additional mass at 11:00 was also removed at time of surgery, all of which final surgical pathology was consistent with fibroadenoma. DARI without mammographic density 14.5%. She denies any palpable abnormalities, no skin changes, no nipple discharge bilaterally. No fever/chills.\par \par Left 9:00 lesion showed growth. Discussed biopsy, patient refused and would like short term follow up.

## 2022-03-02 NOTE — DATA REVIEWED
[FreeTextEntry1] : 8/21/19 ([facility])--b/l breast US showing multiple smoothly outlined hypoechoic solid nodules in the breasts, bilaterally consistent with benign fibroadenomas. Dominant palpable heterogeneous hypoechoic solid nodule left breast 12:00 4cmFN with increased vascularity 3 x 2.1 x 3.1cm. Us guided core biopsy recc to r/o phyllodes tumor BR 4. Remaining solid nodules b/l can be followed in short interval f/u. \par \par 8/23/19 ([facility])--patient has US core biopsy of left 12:00 pathology benign fibroepithelial lesion, FA vs benign phyllodes, left breast 2:00 5cmFN pathology fibroadenoma \par \par --9/23/19 ([facility]) right breast 9:00 US core biopsy pathology fibroadenoma \par \par 9/26/19 ([facility]) Final surgical pathology Left 12:00 fibroadenoma, left breast 11:00 fibroadenoma, left breast 2:00 fibroadenoma. \par \par 2/5/2020 ([facility]) b/l breast US showing right 9:00 3cmFN a 1.5 x 0.7 x 1.7 circumscribed hypoechoic mass, previously biopsied benign pathology. Right 10:00 7cmFN a 0.7 x 0.4 x 0.8cm circumscribed hypoechoic mass. Left breast post-surgical scarring at 12:00. Left 9:00 2-5cmFN a 1.0 x 0.5 x 0.7cm circumscribed hypoechoic mass seen, left breast 9:00 2-5cmFN a 0.7 x 0.4 x 0.9cm circumscribed hypoechoic mass seen. Benign b/l breast US BIRADS 2 \par \par [facility] (7/14/2020) B/l US- R 1.6cm 9:00, 3FN hypoechoic lesion, bx proven fibroadenoma and stable. R 0.7cm, 10:00, 7FN hypoechoic lesion, stable. L 0.9cm, 9:00, 2.5FN and adjacent 0.8cm hypoechoic lesions, stable. BIRADS 3.\par \par \par [facility] (2/8/2021) B/L US: New R 0.6cm, 3:30-4:00, periareolar parallel smooth bordered hypoechoic lesion. R 2cm 9:00, 3FN hypoechoic lesion w/ lobulated borders, bx proven fibroadenoma. R 0.7cm, 10:00, 7FN, oval shaped parallel hypoechoic lesion, stable. L 1 cm and 1.1cm, 9:00, 2.5FN two adjacent oval-shaped hypoechoic lesions, stable. BIRADS 3. \par \par 10/27/21 ([facility]) b/l US: Interval increase in size of a hypoechoic mass in the left 9:00 axis, 4 cm from the nipple, measuring 1.7 cm, for which ultrasound-guided core biopsy is recommended at this time. \par Bilateral stable hypoechoic nodules, all likely representing benign fibroadenomas. BI-RADAS 4. \par \par 2/28/21 ([facility]) b/l US: LEFT 9:00 4 cm from the nipple , there is an oval hypoechoic 21 x 6 x 15 mm mass. This is increased from ultrasound performed 10/27/2021 where it measured 15 x 17 x 8 mm. Recommend ultrasound-guided biopsy. BI-RADS 4A.

## 2022-03-09 ENCOUNTER — NON-APPOINTMENT (OUTPATIENT)
Age: 27
End: 2022-03-09

## 2022-06-13 ENCOUNTER — RESULT REVIEW (OUTPATIENT)
Age: 27
End: 2022-06-13

## 2022-07-29 ENCOUNTER — NON-APPOINTMENT (OUTPATIENT)
Age: 27
End: 2022-07-29

## 2022-08-15 ENCOUNTER — APPOINTMENT (OUTPATIENT)
Dept: MAMMOGRAPHY | Facility: CLINIC | Age: 27
End: 2022-08-15

## 2022-08-15 ENCOUNTER — RESULT REVIEW (OUTPATIENT)
Age: 27
End: 2022-08-15

## 2022-08-15 ENCOUNTER — OUTPATIENT (OUTPATIENT)
Dept: OUTPATIENT SERVICES | Facility: HOSPITAL | Age: 27
LOS: 1 days | End: 2022-08-15

## 2022-08-15 ENCOUNTER — APPOINTMENT (OUTPATIENT)
Dept: ULTRASOUND IMAGING | Facility: CLINIC | Age: 27
End: 2022-08-15

## 2022-08-15 ENCOUNTER — APPOINTMENT (OUTPATIENT)
Dept: BREAST CENTER | Facility: CLINIC | Age: 27
End: 2022-08-15

## 2022-08-15 ENCOUNTER — NON-APPOINTMENT (OUTPATIENT)
Age: 27
End: 2022-08-15

## 2022-08-15 DIAGNOSIS — Z41.9 ENCOUNTER FOR PROCEDURE FOR PURPOSES OTHER THAN REMEDYING HEALTH STATE, UNSPECIFIED: Chronic | ICD-10-CM

## 2022-08-15 PROCEDURE — 76642 ULTRASOUND BREAST LIMITED: CPT | Mod: 26,LT

## 2023-04-24 ENCOUNTER — NON-APPOINTMENT (OUTPATIENT)
Age: 28
End: 2023-04-24

## 2023-05-08 ENCOUNTER — APPOINTMENT (OUTPATIENT)
Dept: BREAST CENTER | Facility: CLINIC | Age: 28
End: 2023-05-08
Payer: COMMERCIAL

## 2023-05-08 VITALS
DIASTOLIC BLOOD PRESSURE: 74 MMHG | SYSTOLIC BLOOD PRESSURE: 113 MMHG | BODY MASS INDEX: 19.7 KG/M2 | WEIGHT: 133 LBS | HEART RATE: 96 BPM | HEIGHT: 69 IN

## 2023-05-08 DIAGNOSIS — R92.8 OTHER ABNORMAL AND INCONCLUSIVE FINDINGS ON DIAGNOSTIC IMAGING OF BREAST: ICD-10-CM

## 2023-05-08 DIAGNOSIS — N63.20 UNSPECIFIED LUMP IN THE LEFT BREAST, UNSPECIFIED QUADRANT: ICD-10-CM

## 2023-05-08 DIAGNOSIS — N63.10 UNSPECIFIED LUMP IN THE RIGHT BREAST, UNSPECIFIED QUADRANT: ICD-10-CM

## 2023-05-08 DIAGNOSIS — D24.2 BENIGN NEOPLASM OF LEFT BREAST: ICD-10-CM

## 2023-05-08 PROCEDURE — 99213 OFFICE O/P EST LOW 20 MIN: CPT

## 2023-05-08 NOTE — DATA REVIEWED
[FreeTextEntry1] : 8/21/19 ([facility])--b/l breast US showing multiple smoothly outlined hypoechoic solid nodules in the breasts, bilaterally consistent with benign fibroadenomas. Dominant palpable heterogeneous hypoechoic solid nodule left breast 12:00 4cmFN with increased vascularity 3 x 2.1 x 3.1cm. Us guided core biopsy recc to r/o phyllodes tumor BR 4. Remaining solid nodules b/l can be followed in short interval f/u. \par \par 8/23/19 ([facility])--patient has US core biopsy of left 12:00 pathology benign fibroepithelial lesion, FA vs benign phyllodes, left breast 2:00 5cmFN pathology fibroadenoma \par \par --9/23/19 ([facility]) right breast 9:00 US core biopsy pathology fibroadenoma \par \par 9/26/19 ([facility]) Final surgical pathology Left 12:00 fibroadenoma, left breast 11:00 fibroadenoma, left breast 2:00 fibroadenoma. \par \par 2/5/2020 ([facility]) b/l breast US showing right 9:00 3cmFN a 1.5 x 0.7 x 1.7 circumscribed hypoechoic mass, previously biopsied benign pathology. Right 10:00 7cmFN a 0.7 x 0.4 x 0.8cm circumscribed hypoechoic mass. Left breast post-surgical scarring at 12:00. Left 9:00 2-5cmFN a 1.0 x 0.5 x 0.7cm circumscribed hypoechoic mass seen, left breast 9:00 2-5cmFN a 0.7 x 0.4 x 0.9cm circumscribed hypoechoic mass seen. Benign b/l breast US BIRADS 2 \par \par [facility] (7/14/2020) B/l US- R 1.6cm 9:00, 3FN hypoechoic lesion, bx proven fibroadenoma and stable. R 0.7cm, 10:00, 7FN hypoechoic lesion, stable. L 0.9cm, 9:00, 2.5FN and adjacent 0.8cm hypoechoic lesions, stable. BIRADS 3.\par \par \par [facility] (2/8/2021) B/L US: New R 0.6cm, 3:30-4:00, periareolar parallel smooth bordered hypoechoic lesion. R 2cm 9:00, 3FN hypoechoic lesion w/ lobulated borders, bx proven fibroadenoma. R 0.7cm, 10:00, 7FN, oval shaped parallel hypoechoic lesion, stable. L 1 cm and 1.1cm, 9:00, 2.5FN two adjacent oval-shaped hypoechoic lesions, stable. BIRADS 3. \par \par 10/27/21 ([facility]) b/l US: Interval increase in size of a hypoechoic mass in the left 9:00 axis, 4 cm from the nipple, measuring 1.7 cm, for which ultrasound-guided core biopsy is recommended at this time. \par Bilateral stable hypoechoic nodules, all likely representing benign fibroadenomas. BI-RADAS 4. \par \par 2/28/22 ([facility]) b/l US: LEFT 9:00 4 cm from the nipple , there is an oval hypoechoic 21 x 6 x 15 mm mass. This is increased from ultrasound performed 10/27/2021 where it measured 15 x 17 x 8 mm. Recommend ultrasound-guided biopsy. BI-RADS 4A. \par *patient refused biopsy\par \par 8/15/22 ([facility]) left breast US: left breast 9:00 4cmFN probably fibroadenoma measuring 2.1cm previously measuring maximally 2.1cm in 2/2022, recommend 6 month follow up US to optimally document 2 year stability. BIRADS 3

## 2023-05-08 NOTE — HISTORY OF PRESENT ILLNESS
[FreeTextEntry1] : 27 year old Moravian female presents for evaluation as she has multiple b/l breast nodules and has a h/o left breast excisional biopsy x 2 on 9/27/19 for left breast mass 12:00 and 2:00. Additional mass at 11:00 was also removed at time of surgery, all of which final surgical pathology was consistent with fibroadenoma. DARI without mammographic density 14.5%. She denies any palpable abnormalities, no skin changes, no nipple discharge bilaterally. No fever/chills. Left 9:00 lesion showed growth. Discussed biopsy, patient refused and would like short term follow up.  Patient is overdue for follow-up imaging at this time.  Of note, the patient now lives in [place].  She is in town for her brother's wedding so would like to get the imaging this week if possible.

## 2023-05-08 NOTE — PHYSICAL EXAM
[Normocephalic] : normocephalic [EOMI] : extra ocular movement intact [Supple] : supple [Examined in the supine and seated position] : examined in the supine and seated position [Symmetrical] : symmetrical [No dominant masses] : no dominant masses in right breast  [No dominant masses] : no dominant masses left breast [No Nipple Retraction] : no left nipple retraction [No Nipple Discharge] : no left nipple discharge [No Axillary Lymphadenopathy] : no left axillary lymphadenopathy [No Rashes] : no rashes [de-identified] : Well-healed periareolar lumpectomy incision

## 2023-05-08 NOTE — ASSESSMENT
[FreeTextEntry1] : 27-year-old female with personal history of left breast excisional biopsy for multiple fibroadenomas and abnormal breast imaging

## 2023-05-08 NOTE — PAST MEDICAL HISTORY
[Menstruating] : The patient is menstruating [Menarche Age ____] : age at menarche was [unfilled] [Total Preg ___] : G[unfilled] [Definite ___ (Date)] : the last menstrual period was [unfilled] [Regular Cycle Intervals] : have been regular [History of Hormone Replacement Treatment] : has no history of hormone replacement treatment [FreeTextEntry5] : NA [FreeTextEntry6] : None [FreeTextEntry7] : yes [FreeTextEntry8] : None

## 2023-05-09 ENCOUNTER — NON-APPOINTMENT (OUTPATIENT)
Age: 28
End: 2023-05-09

## 2023-05-09 ENCOUNTER — APPOINTMENT (OUTPATIENT)
Dept: ULTRASOUND IMAGING | Facility: HOSPITAL | Age: 28
End: 2023-05-09

## 2023-05-09 ENCOUNTER — RESULT REVIEW (OUTPATIENT)
Age: 28
End: 2023-05-09

## 2023-05-09 ENCOUNTER — OUTPATIENT (OUTPATIENT)
Dept: OUTPATIENT SERVICES | Facility: HOSPITAL | Age: 28
LOS: 1 days | End: 2023-05-09
Payer: COMMERCIAL

## 2023-05-09 DIAGNOSIS — Z41.9 ENCOUNTER FOR PROCEDURE FOR PURPOSES OTHER THAN REMEDYING HEALTH STATE, UNSPECIFIED: Chronic | ICD-10-CM

## 2023-05-09 PROCEDURE — 76641 ULTRASOUND BREAST COMPLETE: CPT | Mod: 26,50

## 2023-05-09 PROCEDURE — 76641 ULTRASOUND BREAST COMPLETE: CPT

## 2024-04-23 ENCOUNTER — NON-APPOINTMENT (OUTPATIENT)
Age: 29
End: 2024-04-23

## 2024-09-13 ENCOUNTER — APPOINTMENT (OUTPATIENT)
Dept: ULTRASOUND IMAGING | Facility: HOSPITAL | Age: 29
End: 2024-09-13

## 2024-09-13 ENCOUNTER — APPOINTMENT (OUTPATIENT)
Dept: MAMMOGRAPHY | Facility: HOSPITAL | Age: 29
End: 2024-09-13

## 2024-09-18 ENCOUNTER — RESULT REVIEW (OUTPATIENT)
Age: 29
End: 2024-09-18

## 2024-09-18 ENCOUNTER — APPOINTMENT (OUTPATIENT)
Dept: ULTRASOUND IMAGING | Facility: HOSPITAL | Age: 29
End: 2024-09-18
Payer: COMMERCIAL

## 2024-09-18 ENCOUNTER — OUTPATIENT (OUTPATIENT)
Dept: OUTPATIENT SERVICES | Facility: HOSPITAL | Age: 29
LOS: 1 days | End: 2024-09-18

## 2024-09-18 DIAGNOSIS — Z41.9 ENCOUNTER FOR PROCEDURE FOR PURPOSES OTHER THAN REMEDYING HEALTH STATE, UNSPECIFIED: Chronic | ICD-10-CM

## 2024-09-18 PROCEDURE — 76642 ULTRASOUND BREAST LIMITED: CPT | Mod: 26,50

## 2024-09-18 PROCEDURE — 76642 ULTRASOUND BREAST LIMITED: CPT

## 2024-09-25 ENCOUNTER — NON-APPOINTMENT (OUTPATIENT)
Age: 29
End: 2024-09-25

## 2024-10-02 ENCOUNTER — NON-APPOINTMENT (OUTPATIENT)
Age: 29
End: 2024-10-02

## 2024-10-21 ENCOUNTER — NON-APPOINTMENT (OUTPATIENT)
Age: 29
End: 2024-10-21

## 2024-10-24 ENCOUNTER — APPOINTMENT (OUTPATIENT)
Dept: BREAST CENTER | Facility: CLINIC | Age: 29
End: 2024-10-24
Payer: COMMERCIAL

## 2024-10-24 DIAGNOSIS — N63.20 UNSPECIFIED LUMP IN THE LEFT BREAST, UNSPECIFIED QUADRANT: ICD-10-CM

## 2024-10-24 DIAGNOSIS — R92.8 OTHER ABNORMAL AND INCONCLUSIVE FINDINGS ON DIAGNOSTIC IMAGING OF BREAST: ICD-10-CM

## 2024-10-24 DIAGNOSIS — N63.10 UNSPECIFIED LUMP IN THE RIGHT BREAST, UNSPECIFIED QUADRANT: ICD-10-CM

## 2024-10-24 DIAGNOSIS — D24.2 BENIGN NEOPLASM OF LEFT BREAST: ICD-10-CM

## 2024-10-24 PROCEDURE — 99214 OFFICE O/P EST MOD 30 MIN: CPT | Mod: 95

## 2024-12-02 ENCOUNTER — TRANSCRIPTION ENCOUNTER (OUTPATIENT)
Age: 29
End: 2024-12-02

## 2024-12-02 DIAGNOSIS — D24.1 BENIGN NEOPLASM OF RIGHT BREAST: ICD-10-CM
